# Patient Record
Sex: MALE | Race: WHITE | NOT HISPANIC OR LATINO | URBAN - METROPOLITAN AREA
[De-identification: names, ages, dates, MRNs, and addresses within clinical notes are randomized per-mention and may not be internally consistent; named-entity substitution may affect disease eponyms.]

---

## 2017-01-04 PROBLEM — Z00.00 ENCOUNTER FOR PREVENTIVE HEALTH EXAMINATION: Status: ACTIVE | Noted: 2017-01-04

## 2018-10-10 ENCOUNTER — OUTPATIENT (OUTPATIENT)
Dept: OUTPATIENT SERVICES | Facility: HOSPITAL | Age: 78
LOS: 1 days | End: 2018-10-10
Payer: MEDICARE

## 2018-10-10 ENCOUNTER — APPOINTMENT (OUTPATIENT)
Dept: CT IMAGING | Facility: CLINIC | Age: 78
End: 2018-10-10
Payer: MEDICARE

## 2018-10-10 DIAGNOSIS — Z00.8 ENCOUNTER FOR OTHER GENERAL EXAMINATION: ICD-10-CM

## 2018-10-10 PROCEDURE — 74177 CT ABD & PELVIS W/CONTRAST: CPT

## 2018-10-10 PROCEDURE — 74177 CT ABD & PELVIS W/CONTRAST: CPT | Mod: 26

## 2018-10-30 ENCOUNTER — APPOINTMENT (OUTPATIENT)
Dept: RADIOLOGY | Facility: CLINIC | Age: 78
End: 2018-10-30
Payer: MEDICARE

## 2018-10-30 ENCOUNTER — OUTPATIENT (OUTPATIENT)
Dept: OUTPATIENT SERVICES | Facility: HOSPITAL | Age: 78
LOS: 1 days | End: 2018-10-30
Payer: MEDICARE

## 2018-10-30 DIAGNOSIS — Z00.8 ENCOUNTER FOR OTHER GENERAL EXAMINATION: ICD-10-CM

## 2018-10-30 PROCEDURE — 71046 X-RAY EXAM CHEST 2 VIEWS: CPT

## 2018-10-30 PROCEDURE — 71046 X-RAY EXAM CHEST 2 VIEWS: CPT | Mod: 26

## 2019-05-20 ENCOUNTER — APPOINTMENT (OUTPATIENT)
Dept: RADIOLOGY | Facility: CLINIC | Age: 79
End: 2019-05-20
Payer: MEDICARE

## 2019-05-20 ENCOUNTER — OUTPATIENT (OUTPATIENT)
Dept: OUTPATIENT SERVICES | Facility: HOSPITAL | Age: 79
LOS: 1 days | End: 2019-05-20
Payer: MEDICARE

## 2019-05-20 DIAGNOSIS — Z00.8 ENCOUNTER FOR OTHER GENERAL EXAMINATION: ICD-10-CM

## 2019-05-20 PROCEDURE — 71046 X-RAY EXAM CHEST 2 VIEWS: CPT | Mod: 26

## 2019-05-20 PROCEDURE — 71046 X-RAY EXAM CHEST 2 VIEWS: CPT

## 2020-02-04 ENCOUNTER — OUTPATIENT (OUTPATIENT)
Dept: OUTPATIENT SERVICES | Facility: HOSPITAL | Age: 80
LOS: 1 days | End: 2020-02-04
Payer: MEDICARE

## 2020-02-04 ENCOUNTER — APPOINTMENT (OUTPATIENT)
Dept: RADIOLOGY | Facility: CLINIC | Age: 80
End: 2020-02-04
Payer: MEDICARE

## 2020-02-04 DIAGNOSIS — Z00.8 ENCOUNTER FOR OTHER GENERAL EXAMINATION: ICD-10-CM

## 2020-02-04 PROCEDURE — 71046 X-RAY EXAM CHEST 2 VIEWS: CPT | Mod: 26

## 2020-02-04 PROCEDURE — 71046 X-RAY EXAM CHEST 2 VIEWS: CPT

## 2020-10-20 ENCOUNTER — APPOINTMENT (OUTPATIENT)
Dept: RADIOLOGY | Facility: CLINIC | Age: 80
End: 2020-10-20
Payer: MEDICARE

## 2020-10-20 ENCOUNTER — OUTPATIENT (OUTPATIENT)
Dept: OUTPATIENT SERVICES | Facility: HOSPITAL | Age: 80
LOS: 1 days | End: 2020-10-20
Payer: MEDICARE

## 2020-10-20 DIAGNOSIS — Z00.8 ENCOUNTER FOR OTHER GENERAL EXAMINATION: ICD-10-CM

## 2020-10-20 PROCEDURE — 71046 X-RAY EXAM CHEST 2 VIEWS: CPT | Mod: 26

## 2020-10-20 PROCEDURE — 71046 X-RAY EXAM CHEST 2 VIEWS: CPT

## 2021-03-26 ENCOUNTER — RESULT REVIEW (OUTPATIENT)
Age: 81
End: 2021-03-26

## 2021-11-27 ENCOUNTER — EMERGENCY (EMERGENCY)
Facility: HOSPITAL | Age: 81
LOS: 1 days | Discharge: HOME | End: 2021-11-27
Attending: EMERGENCY MEDICINE | Admitting: EMERGENCY MEDICINE
Payer: MEDICARE

## 2021-11-27 VITALS
DIASTOLIC BLOOD PRESSURE: 78 MMHG | SYSTOLIC BLOOD PRESSURE: 141 MMHG | TEMPERATURE: 97 F | OXYGEN SATURATION: 99 % | HEART RATE: 90 BPM | HEIGHT: 65 IN | RESPIRATION RATE: 18 BRPM | WEIGHT: 164.91 LBS

## 2021-11-27 VITALS
OXYGEN SATURATION: 99 % | RESPIRATION RATE: 16 BRPM | TEMPERATURE: 98 F | DIASTOLIC BLOOD PRESSURE: 93 MMHG | SYSTOLIC BLOOD PRESSURE: 164 MMHG | HEART RATE: 85 BPM

## 2021-11-27 PROCEDURE — 99283 EMERGENCY DEPT VISIT LOW MDM: CPT

## 2021-11-27 RX ORDER — INDOMETHACIN 50 MG
1 CAPSULE ORAL
Qty: 15 | Refills: 0
Start: 2021-11-27 | End: 2021-12-01

## 2021-11-27 RX ORDER — INDOMETHACIN 50 MG
25 CAPSULE ORAL ONCE
Refills: 0 | Status: COMPLETED | OUTPATIENT
Start: 2021-11-27 | End: 2021-11-27

## 2021-11-27 RX ADMIN — Medication 40 MILLIGRAM(S): at 11:37

## 2021-11-27 RX ADMIN — Medication 25 MILLIGRAM(S): at 11:37

## 2021-11-27 NOTE — ED ADULT NURSE NOTE - NS ED NOTE  TALK SOMEONE YN
----- Message from Justyna Gonzalez sent at 10/19/2017  3:34 PM CDT -----  Contact: Patient  Patient called and stated she was in the office today and she was given a sheet that she brought home with her. She isn't sure if she was supposed to hand it back in to registration. Please call her at [phone].    Thanks,  Justyna   No

## 2021-11-27 NOTE — ED PROVIDER NOTE - PATIENT PORTAL LINK FT
You can access the FollowMyHealth Patient Portal offered by [facility] by registering at the following website: [email or web address]. By joining [employer]’s FollowMyHealth portal, you will also be able to view your health information using other applications (apps) compatible with our system.

## 2021-11-27 NOTE — ED PROVIDER NOTE - MUSCULOSKELETAL, MLM
Marked tenderness to palpation even with light touch to right medial foot with slight erythema and warmth

## 2021-11-27 NOTE — ED ADULT NURSE NOTE - OBJECTIVE STATEMENT
Right foot redness & swelling, positive pedal pulses, skin intact, mild swelling noted at the malleolus, afebrile, pain when ambulating 5/10.

## 2021-11-27 NOTE — ED PROVIDER NOTE - NSICDXPASTSURGICALHX_GEN_ALL_CORE_FT
PAST SURGICAL HISTORY:  Neck mass lesion back of neck excised    Throat mass pre-cancerous tumor removed 1990

## 2021-11-27 NOTE — ED PROVIDER NOTE - OBJECTIVE STATEMENT
82 yo white male with atraumatic pain to right medial foot x few days. Also note that area was slight red and warm. States that even having light sheet touching area created pain. Exquisitely sensitive to light touch. No fever or chills. No nausea or vomiting.

## 2021-11-27 NOTE — ED PROVIDER NOTE - CPE EDP NEURO NORM
OT ACUTE Treatment                                                                                                                                                BASELINE STATUS COMPARED WITH CURRENT STATUS: Presents with ADL/IADL status below baseline which was modified independent-min assist . Pt from mobile home with son.    ASSESSMENT:  Treatment today focused on out of bed activity in prep for ADL completion at sink. Pt requires min assist/light steadying assist x1 for all transfers/mobility in room with 2ww. Pt requires increased time to complete all activity due to fatigue/deconditioning. Pt requires complete assist to complete all low body cares which is significantly below baseline. Anticipate pt would benfit from less intensive rehab stay to progress strength/endurance and independence during ADLs/transfers.    RECOMMENDATIONS FOR DISCHARGE:  Recommendations for Discharge: OT: Less intensive rehab (08/07/17 1532)  OT Identified Barriers to Discharge: Medical status, weakness.   Equipment:  PT/OT Mobility Equipment for Discharge: no needs anticipated (07/28/17 0940)  PT/OT ADL Equipment for Discharge: Continue to assess. (08/02/17 2962)    PLAN: Continue skilled OT  Treatment Plan for Next Session: commode transfer vs ADL out of bed        Frequency Comments: M-F, (SA), LH, Alexandra, AM    AM-PAC Outcomes -Daily Activity Domain  How much help from another person does the patient currently need?*  Task Score  Norm   1. Putting on and taking off regular lower body clothing? 1 - Total   2. Bathing (including washing, rinsing, drying)?   2 - A Lot   3. Toileting, which includes using toilet, bedpan, or urinal? 2 - A Lot   4. Putting on and taking off regular upper body clothing? 3 - A Little   5. Taking care of personal grooming such as brushing teeth? 3 - A Little   6. Eating meals?  3 - A Little   Raw Score: 14/24   Converted Score:  33.39 (Raw score = 14)   G code conversion CK: 40- 59% impairment (Raw score:  13-18)   Converted score >39.4 predictive of discharge to home  *Score based on clinical judgment/expected performance, may not have been performed during this session  Scoring Guidelines  1) Patient may use assistive devices unless otherwise indicated in question  2) Do not consider help for management of medical devices only (IV poles, catheters, NG, etc) as part of assist level  3) If patient requires device that substitutes for toileting or eating function (catheter, NG tube, PEG) they do not engage in these activities and are scored as Total  4) If activity was not observed and patient unable to do the activity, select \"Total\" .  If the patient can do the activity but was not directly observed, use profession judgment to determine how much assistance needed.    Diagnosis:  1. Anemia, unspecified type    2. Iron deficiency    3. Hypoxia        Co-morbidities:   Patient Active Problem List   Diagnosis   • CAD (coronary artery disease)   • Hyperlipidemia   • Gastroparesis   • Obesity   • HTN (hypertension)   • Diabetes mellitus (CMS/HCC)   • Congestive heart failure (CMS/HCC)   • Ischemic cardiomyopathy   • End stage renal disease (CMS/HCC)   • Dermatophytosis of nail   • Pain in limb   • Other hammer toe (acquired)   • Edema   • S/P TKR (total knee replacement)   • History of total hip arthroplasty   • Knee joint replacement status   • Osteoarthrosis, unspecified whether generalized or localized, pelvic region and thigh   • Ovarian tumor of borderline malignancy, right   • PMB (postmenopausal bleeding)   • Brachial vein thrombosis, left (CMS/HCC)   • Colon cancer (CMS/HCC)   • Lymphedema of both lower extremities   • Panniculitis, unspecified       Precautions:  Precautions  Other Precautions: Bariatric needs, transferred to CCU 8/1/17 due to hypoxia/AMS. (08/02/17 1602)  Precautions Comments: Hx CHF, COPD, HTN (08/02/17 1602)    Prior Living Situation:  Type of Home: Mobile home (07/27/17 1400)  Lives With:  Spouse (07/27/17 1400)    EDUCATION:   On this date, the patient was educated on plan of care.    The response to education was: Verbalizes understanding                                                    SUBJECTIVE: Patient's Personal Goal: get stonger, more independent (08/07/17 1532)   Subjective: pt agreeable to out of bed activity (08/07/17 1532)  Subjective/Objective Comments: pt seated in w/c at end of session with call light  (08/07/17 1532)    OBJECTIVE:Basic Lines: Capped IV;Street catheter;Telemetry (08/07/17 1532)    RN reported Stoddard Fall Scale Score: 35       Last 24 hours of Functional Data     ADLs  Self Cares/ADL's  Bathing Assistance: Total Assist - Non-dependent (08/07/17 1532)  Bathing Deficit: Left lower leg including foot;Right lower leg including foot (and to lotion following cleaning) (08/07/17 1532)  Footwear Assistance: Total Assist - Non-dependent (08/07/17 1532)  Lower Body Dressing Deficit: Don/doff R sock;Don/doff L sock;Don/doff R shoe;Don/doff L shoe (08/07/17 1532)  Self Cares/ADL's Comments #1: pt limited with low body cares due to weakness/fatigue (08/07/17 1532)    Household mobility  Household Mobility  Supine to Sit: Minimal Assist (Min) (08/07/17 1532)  Sit to Stand: Touching/Steadying Assistance (08/07/17 1532)  Stand to Sit: Touching/Steadying Assistance (08/07/17 1532)  Transfer Equipment: 2ww, gait belt (08/07/17 1532)  Sitting - Static: Modified Independent (08/07/17 1532)  Sitting - Dynamic: Supervision (08/07/17 1532)  Standing - Static: Supervision (08/07/17 1532)  Standing - Dynamic: Touching/Steadying Assistance (08/07/17 1532)  Household Mobility Comments #1: overall light assist required to complete all transfers/mobility however pt with low activity tolerance requiring increased time (08/07/17 1532)    Home Management       Tolerance  OT Activity Tolerance  Activity Tolerance: 1:1 Activity to rest (08/07/17 1532)  Activity Tolerance Comments: fair (08/07/17  1532)    Vitals       Cognition            Patient's Personal Goal: get stonger, more independent (08/07/17 1532)    Therapy Goals  Goals  Short Term Goals to Be Reviewed On: 08/09/17 (08/02/17 1602)  Short Term Goals Are The Same as Discharge Goals: Yes (08/02/17 1602)  Goal Agreement: Patient agrees with goals and treatment plan (08/02/17 1602)  Grooming Discharge Goal 1: mod I standing at sink (08/02/17 1602)  Bathing Discharge Goal 1: mod I spongebathing (08/02/17 1602)  Dressing Discharge Goal 1: mod I (AE prn) (08/02/17 1602)  Toileting Discharge Goal 1: mod I (08/02/17 1602)    Interventions and Treatment Time  Treatment Interventions: ADL retraining;Functional transfer training;UE strengthening/ROM;Endurance training;Patient/Family training;Equipment eval/education;Compensatory technique education (08/02/17 1602)  OT Time Spent: 38 minutes (08/07/17 1532)      See OT flowsheet for full details regarding the OT therapy provided.      normal...

## 2021-11-27 NOTE — ED PROVIDER NOTE - CARE PROVIDER_API CALL
Hitesh Sanchez (MD)  Internal Medicine; Rheumatology  [address] [place] [address]  Phone: [phone]  Fax: [phone]  Follow Up Time:

## 2021-11-27 NOTE — ED ADULT NURSE NOTE - CHIEF COMPLAINT QUOTE
Patient is a 80yo male complaining of right foot pain redness starting thursday morning. Patient went to [facility] and was sent to [facility] for cellulitis

## 2021-11-27 NOTE — ED ADULT TRIAGE NOTE - CHIEF COMPLAINT QUOTE
Patient is a 82yo male complaining of right foot pain redness starting thursday morning. Patient went to [facility] and was sent to [facility] for cellulitis

## 2021-11-27 NOTE — ED PROVIDER NOTE - NSFOLLOWUPINSTRUCTIONS_ED_ALL_ED_FT
Rest  Take INDOCIN 25 mg every 8-hours for the next 5-days  Take PREDNISONE 20 mg, 2-tablet every day for the next 5-days  Follow-up with your doctor this week  Return here if needed          [employer]® CareNotes®     :  [facility]  	                       GOUT - AfterCare(R) Instructions(ER/ED)           Gout    WHAT YOU NEED TO KNOW:    Gout is a form of arthritis that causes severe joint pain, redness, swelling, and stiffness. Acute gout pain starts suddenly, gets worse quickly, and stops on its own. Acute gout can become chronic and cause permanent damage to the joints.    DISCHARGE INSTRUCTIONS:    Return to the emergency department if:   •You have severe pain in one or more of your joints that you cannot tolerate.      •You have a fever or redness that spreads beyond the joint area.      Call your doctor if:   •You have new symptoms, such as a rash, after you start gout treatment.      •Your joint pain and swelling do not go away, even after treatment.      •You are not urinating as much or as often as you usually do.      •You have trouble taking your gout medicines.      •You have questions or concerns about your condition or care.      Medicines: You may need any of the following:   •Prescription pain medicine may be given. Ask your healthcare provider how to take this medicine safely. Some prescription pain medicines contain acetaminophen. Do not take other medicines that contain acetaminophen without talking to your healthcare provider. Too much acetaminophen may cause liver damage. Prescription pain medicine may cause constipation. Ask your healthcare provider how to prevent or treat constipation.       •NSAIDs, such as ibuprofen, help decrease swelling, pain, and fever. This medicine is available with or without a doctor's order. NSAIDs can cause stomach bleeding or kidney problems in certain people. If you take blood thinner medicine, always ask your healthcare provider if NSAIDs are safe for you. Always read the medicine label and follow directions.      •Gout medicine decreases joint pain and swelling. It may also be given to prevent new gout attacks.      •Steroids reduce inflammation and can help your joint stiffness and pain during gout attacks.      •Uric acid medicine may be given to reduce the amount of uric acid your body makes. Some medicines may help you pass more uric acid when you urinate.      •Take your medicine as directed. Contact your healthcare provider if you think your medicine is not helping or if you have side effects. Tell him or her if you are allergic to any medicine. Keep a list of the medicines, vitamins, and herbs you take. Include the amounts, and when and why you take them. Bring the list or the pill bottles to follow-up visits. Carry your medicine list with you in case of an emergency.      Manage your symptoms:     R.I.C.E.     •Rest your painful joint so it can heal. Your healthcare provider may recommend crutches or a walker if the affected joint is in a leg.      •Apply ice to your joint. Ice decreases pain and swelling. Use an ice pack, or put crushed ice in a plastic bag. Cover the ice pack or bag with a towel before you apply it to your painful joint. Apply ice for 15 to 20 minutes every hour, or as directed.      •Elevate your joint. Elevation helps reduce swelling and pain. Raise your joint above the level of your heart as often as you can. Prop your painful joint on pillows to keep it above your heart comfortably.      •Go to physical therapy if directed. A physical therapist can teach you exercises to improve flexibility and range of motion.      Help prevent gout attacks:   •Do not eat high-purine foods. These foods include meats, seafood, asparagus, spinach, cauliflower, and some types of beans. Healthcare providers may tell you to eat more low-fat milk products, such as yogurt. Milk products may decrease your risk for gout attacks. Vitamin C and coffee may also help. Your healthcare provider or dietitian can help you create a meal plan.      •Drink liquids as directed. Liquids such as water help remove uric acid from your body. Ask how much liquid to drink each day and which liquids are best for you.      •Maintain a healthy weight. Weight loss may decrease the amount of uric acid in your body. Ask your healthcare provider what a healthy weight is for you. Ask him or her to help you create a weight loss plan if you are overweight.      •Control your blood sugar level if you have diabetes. Keep your blood sugar level in a normal range. This can help prevent gout attacks.      •Limit or do not drink alcohol as directed. Alcohol can trigger a gout attack. Alcohol also increases your risk for dehydration. Ask your healthcare provider if alcohol is safe for you.      Follow up with your doctor as directed: You may be referred to a rheumatologist or podiatrist. Write down your questions so you remember to ask them during your visits.       © Copyright [employer] 2021

## 2021-11-27 NOTE — ED PROVIDER NOTE - BIRTH SEX
Male
Exclude Pending Lab and Radiology orders from printing on the Patient's Discharge Instructions, due to Privacy Concerns.

## 2021-11-27 NOTE — ED PROVIDER NOTE - NSICDXPASTMEDICALHX_GEN_ALL_CORE_FT
PAST MEDICAL HISTORY:  COPD (chronic obstructive pulmonary disease)     Hyperlipemia     Hypertension     Prostate enlargement

## 2024-05-03 ENCOUNTER — OUTPATIENT (OUTPATIENT)
Dept: OUTPATIENT SERVICES | Facility: HOSPITAL | Age: 84
LOS: 1 days | End: 2024-05-03
Payer: MEDICARE

## 2024-05-03 ENCOUNTER — APPOINTMENT (OUTPATIENT)
Dept: RADIOLOGY | Facility: CLINIC | Age: 84
End: 2024-05-03
Payer: MEDICARE

## 2024-05-03 DIAGNOSIS — R05.9 COUGH, UNSPECIFIED: ICD-10-CM

## 2024-05-03 PROCEDURE — 71046 X-RAY EXAM CHEST 2 VIEWS: CPT

## 2024-05-03 PROCEDURE — 71046 X-RAY EXAM CHEST 2 VIEWS: CPT | Mod: 26

## 2024-09-27 ENCOUNTER — EMERGENCY (EMERGENCY)
Facility: HOSPITAL | Age: 84
LOS: 1 days | Discharge: HOME | End: 2024-09-27
Attending: STUDENT IN AN ORGANIZED HEALTH CARE EDUCATION/TRAINING PROGRAM | Admitting: STUDENT IN AN ORGANIZED HEALTH CARE EDUCATION/TRAINING PROGRAM
Payer: MEDICARE

## 2024-09-27 VITALS
WEIGHT: 160.06 LBS | DIASTOLIC BLOOD PRESSURE: 88 MMHG | RESPIRATION RATE: 18 BRPM | HEART RATE: 127 BPM | TEMPERATURE: 98 F | SYSTOLIC BLOOD PRESSURE: 152 MMHG | OXYGEN SATURATION: 97 % | HEIGHT: 66 IN

## 2024-09-27 VITALS
TEMPERATURE: 98 F | RESPIRATION RATE: 18 BRPM | OXYGEN SATURATION: 98 % | HEART RATE: 87 BPM | SYSTOLIC BLOOD PRESSURE: 140 MMHG | DIASTOLIC BLOOD PRESSURE: 78 MMHG

## 2024-09-27 LAB
ALBUMIN SERPL ELPH-MCNC: 3.3 G/DL — SIGNIFICANT CHANGE UP (ref 3.3–5)
ALP SERPL-CCNC: 78 U/L — SIGNIFICANT CHANGE UP (ref 40–120)
ALT FLD-CCNC: 13 U/L — SIGNIFICANT CHANGE UP (ref 12–78)
ANION GAP SERPL CALC-SCNC: 10 MMOL/L — SIGNIFICANT CHANGE UP (ref 5–17)
APTT BLD: 30 SEC — SIGNIFICANT CHANGE UP (ref 24.5–35.6)
AST SERPL-CCNC: 14 U/L — LOW (ref 15–37)
BASOPHILS # BLD AUTO: 0.04 K/UL — SIGNIFICANT CHANGE UP (ref 0–0.2)
BASOPHILS NFR BLD AUTO: 0.7 % — SIGNIFICANT CHANGE UP (ref 0–2)
BILIRUB SERPL-MCNC: 0.7 MG/DL — SIGNIFICANT CHANGE UP (ref 0.2–1.2)
BUN SERPL-MCNC: 29 MG/DL — HIGH (ref 7–23)
CALCIUM SERPL-MCNC: 9.3 MG/DL — SIGNIFICANT CHANGE UP (ref 8.5–10.1)
CHLORIDE SERPL-SCNC: 107 MMOL/L — SIGNIFICANT CHANGE UP (ref 96–108)
CO2 SERPL-SCNC: 25 MMOL/L — SIGNIFICANT CHANGE UP (ref 22–31)
CREAT SERPL-MCNC: 2.1 MG/DL — HIGH (ref 0.5–1.3)
EGFR: 30 ML/MIN/1.73M2 — LOW
EOSINOPHIL # BLD AUTO: 0.09 K/UL — SIGNIFICANT CHANGE UP (ref 0–0.5)
EOSINOPHIL NFR BLD AUTO: 1.6 % — SIGNIFICANT CHANGE UP (ref 0–6)
GLUCOSE SERPL-MCNC: 162 MG/DL — HIGH (ref 70–99)
HCT VFR BLD CALC: 35.5 % — LOW (ref 39–50)
HGB BLD-MCNC: 11.7 G/DL — LOW (ref 13–17)
IMM GRANULOCYTES NFR BLD AUTO: 0.5 % — SIGNIFICANT CHANGE UP (ref 0–0.9)
INR BLD: 1.07 RATIO — SIGNIFICANT CHANGE UP (ref 0.85–1.16)
LYMPHOCYTES # BLD AUTO: 1.22 K/UL — SIGNIFICANT CHANGE UP (ref 1–3.3)
LYMPHOCYTES # BLD AUTO: 21 % — SIGNIFICANT CHANGE UP (ref 13–44)
MCHC RBC-ENTMCNC: 29.9 PG — SIGNIFICANT CHANGE UP (ref 27–34)
MCHC RBC-ENTMCNC: 33 GM/DL — SIGNIFICANT CHANGE UP (ref 32–36)
MCV RBC AUTO: 90.8 FL — SIGNIFICANT CHANGE UP (ref 80–100)
MONOCYTES # BLD AUTO: 0.55 K/UL — SIGNIFICANT CHANGE UP (ref 0–0.9)
MONOCYTES NFR BLD AUTO: 9.5 % — SIGNIFICANT CHANGE UP (ref 2–14)
NEUTROPHILS # BLD AUTO: 3.87 K/UL — SIGNIFICANT CHANGE UP (ref 1.8–7.4)
NEUTROPHILS NFR BLD AUTO: 66.7 % — SIGNIFICANT CHANGE UP (ref 43–77)
NRBC # BLD: 0 /100 WBCS — SIGNIFICANT CHANGE UP (ref 0–0)
PLATELET # BLD AUTO: 186 K/UL — SIGNIFICANT CHANGE UP (ref 150–400)
POTASSIUM SERPL-MCNC: 4.4 MMOL/L — SIGNIFICANT CHANGE UP (ref 3.5–5.3)
POTASSIUM SERPL-SCNC: 4.4 MMOL/L — SIGNIFICANT CHANGE UP (ref 3.5–5.3)
PROT SERPL-MCNC: 6.6 G/DL — SIGNIFICANT CHANGE UP (ref 6–8.3)
PROTHROM AB SERPL-ACNC: 12.6 SEC — SIGNIFICANT CHANGE UP (ref 9.9–13.4)
RBC # BLD: 3.91 M/UL — LOW (ref 4.2–5.8)
RBC # FLD: 13.1 % — SIGNIFICANT CHANGE UP (ref 10.3–14.5)
SODIUM SERPL-SCNC: 142 MMOL/L — SIGNIFICANT CHANGE UP (ref 135–145)
WBC # BLD: 5.8 K/UL — SIGNIFICANT CHANGE UP (ref 3.8–10.5)
WBC # FLD AUTO: 5.8 K/UL — SIGNIFICANT CHANGE UP (ref 3.8–10.5)

## 2024-09-27 PROCEDURE — 86077 PHYS BLOOD BANK SERV XMATCH: CPT

## 2024-09-27 PROCEDURE — 71045 X-RAY EXAM CHEST 1 VIEW: CPT | Mod: 26

## 2024-09-27 PROCEDURE — 99285 EMERGENCY DEPT VISIT HI MDM: CPT

## 2024-09-27 PROCEDURE — 93010 ELECTROCARDIOGRAM REPORT: CPT

## 2024-09-27 PROCEDURE — 73090 X-RAY EXAM OF FOREARM: CPT | Mod: 26,LT

## 2024-09-27 PROCEDURE — 73120 X-RAY EXAM OF HAND: CPT | Mod: 26,RT

## 2024-09-27 RX ORDER — ACETAMINOPHEN 325 MG/1
2 TABLET ORAL
Qty: 30 | Refills: 0
Start: 2024-09-27

## 2024-09-27 RX ORDER — OXYCODONE AND ACETAMINOPHEN 7.5; 325 MG/1; MG/1
1 TABLET ORAL
Qty: 5 | Refills: 0
Start: 2024-09-27

## 2024-09-27 RX ORDER — SODIUM CHLORIDE 9 MG/ML
1000 INJECTION INTRAMUSCULAR; INTRAVENOUS; SUBCUTANEOUS ONCE
Refills: 0 | Status: COMPLETED | OUTPATIENT
Start: 2024-09-27 | End: 2024-09-27

## 2024-09-27 RX ORDER — ACETAMINOPHEN 325 MG/1
1000 TABLET ORAL ONCE
Refills: 0 | Status: COMPLETED | OUTPATIENT
Start: 2024-09-27 | End: 2024-09-27

## 2024-09-27 RX ORDER — DOXYCYCLINE MONOHYDRATE 100 MG
1 TABLET ORAL
Qty: 20 | Refills: 0
Start: 2024-09-27 | End: 2024-10-06

## 2024-09-27 RX ORDER — POVIDONE-IODINE 10 %
1 SOLUTION, NON-ORAL TOPICAL ONCE
Refills: 0 | Status: COMPLETED | OUTPATIENT
Start: 2024-09-27 | End: 2024-09-27

## 2024-09-27 RX ORDER — TETANUS TOXOID, REDUCED DIPHTHERIA TOXOID AND ACELLULAR PERTUSSIS VACCINE, ADSORBED 5; 2.5; 8; 8; 2.5 [IU]/.5ML; [IU]/.5ML; UG/.5ML; UG/.5ML; UG/.5ML
0.5 SUSPENSION INTRAMUSCULAR ONCE
Refills: 0 | Status: COMPLETED | OUTPATIENT
Start: 2024-09-27 | End: 2024-09-27

## 2024-09-27 RX ORDER — DOXYCYCLINE MONOHYDRATE 100 MG
100 TABLET ORAL ONCE
Refills: 0 | Status: COMPLETED | OUTPATIENT
Start: 2024-09-27 | End: 2024-09-27

## 2024-09-27 RX ORDER — LIDOCAINE HCL/EPINEPHRINE 2%-1:50000
5 SYRINGE (ML) INJECTION ONCE
Refills: 0 | Status: COMPLETED | OUTPATIENT
Start: 2024-09-27 | End: 2024-09-27

## 2024-09-27 RX ORDER — BACITRACIN 500 UNIT/G
1 OINTMENT (GRAM) TOPICAL ONCE
Refills: 0 | Status: COMPLETED | OUTPATIENT
Start: 2024-09-27 | End: 2024-09-27

## 2024-09-27 RX ORDER — IBUPROFEN 600 MG
1 TABLET ORAL
Qty: 20 | Refills: 0
Start: 2024-09-27

## 2024-09-27 RX ADMIN — Medication 1 APPLICATION(S): at 18:44

## 2024-09-27 RX ADMIN — TETANUS TOXOID, REDUCED DIPHTHERIA TOXOID AND ACELLULAR PERTUSSIS VACCINE, ADSORBED 0.5 MILLILITER(S): 5; 2.5; 8; 8; 2.5 SUSPENSION INTRAMUSCULAR at 14:01

## 2024-09-27 RX ADMIN — SODIUM CHLORIDE 1000 MILLILITER(S): 9 INJECTION INTRAMUSCULAR; INTRAVENOUS; SUBCUTANEOUS at 14:00

## 2024-09-27 RX ADMIN — ACETAMINOPHEN 400 MILLIGRAM(S): 325 TABLET ORAL at 14:00

## 2024-09-27 RX ADMIN — ACETAMINOPHEN 1000 MILLIGRAM(S): 325 TABLET ORAL at 14:40

## 2024-09-27 RX ADMIN — Medication 100 MILLIGRAM(S): at 14:31

## 2024-09-27 RX ADMIN — Medication 5 MILLILITER(S): at 18:44

## 2024-09-27 NOTE — ED PROVIDER NOTE - PHYSICAL EXAMINATION
Gen: Awake, Alert, NAD, elderly  Head:  NC/AT  Eyes:  PERRL, EOMI, Conjunctiva pink, no scleral icterus  ENT:  no exudates, no erythema, uvula midline, TMs clear bilaterally, moist mucus membranes  Neck: supple, nontender, no meningismus, no JVD, trachea midline  Cardiac/CV:  S1 S2, RRR, no murmurs  Respiratory/Pulm:  CTAB, good air movement, normal resp effort, no wheezes/stridor/retractions/rales/rhonchi  Gastrointestinal/Abdomen:  Soft, nontender, nondistended, no rebound/guarding  Pelvis: stable, nontender, Hips: FROM, nontender  Back:  no CVAT, no MLT  Ext:  warm, well perfused, moving all extremities spontaneously, no cyanosis, no erythema, no edema, distal pulses intact  Skin: about 4cm circular area of skin avulsion down to fascia and muscle is visualized, unclear if there is deeper tissue damage.   Neuro:  AAOx3, sensation intact, motor 5/5 x 4 extremities, clear speech, normal gait

## 2024-09-27 NOTE — ED PROVIDER NOTE - PATIENT PORTAL LINK FT
You can access the FollowMyHealth Patient Portal offered by [facility] by registering at the following website: [email or web address]. By joining [employer]’s FollowMyHealth portal, you will also be able to view your health information using other applications (apps) compatible with our system.

## 2024-09-27 NOTE — ED PROCEDURE NOTE - CPROC ED LACER REPAIR DETAIL1
The wound was explored to base in bloodless field./All foreign material was removed./Multiple flaps were aligned./Undermining was performed./Extensive debridement was performed.

## 2024-09-27 NOTE — ED PROCEDURE NOTE - PROCEDURE ADDITIONAL DETAILS
-LARGE WOUND 16 X 8 CM TISSUE LOSS  -ELECTROCAUTERY USED TO ELEVATE ADIPOSE CUTANEOUS FLAPS WITH BURROWS TRIANGLE EXCISED ALLOWING FOR THE ADVANCEMENT FLAPS TO BE TRANSFERRED TO A CENTRAL POINT AND THEN CLOSED IN MULTIPLE LAYERS AS DESCRIBED  -THE TISSUE WAS JUDICIOUSLY DEBRIDED   -GIVEN DOSE OF CLINDAMYCIN DUE TO PCN ALLERGY AND SENT WITH PRESCRIPTION  -ARM IMMOBILIZER PLACED  -DISCUSSED WITH PATIENT AND DAUGHTER ABOUT THE COMPLICATIONS OF CLOSING DOGBITES AND I WILL CHECK WOUND ON SUNDAY  -NO SHOWERING  -DISCUSSED PERFUSION AND EDEMA OF HAND WITH DAUGHTER AND WILL CONTACT ME IF THERE ARE ANY CHANGES

## 2024-09-27 NOTE — ED PROVIDER NOTE - PROVIDER TOKENS
PROVIDER:[TOKEN:[[id number]],FOLLOWUP:[1-3 Days]],PROVIDER:[TOKEN:[[id number]],FOLLOWUP:[1-3 Days]]

## 2024-09-27 NOTE — ED ADULT TRIAGE NOTE - SOURCE OF INFORMATION
Bleeding that does not stop/Swelling that gets worse/Fever greater than (need to indicate Fahrenheit or Celsius)/Wound/Surgical Site with redness, or foul smelling discharge or pus/Nausea and vomiting that does not stop
Patient

## 2024-09-27 NOTE — ED PROVIDER NOTE - OBJECTIVE STATEMENT
84M who presents with dog bite. patient states earlier today he was out on his street going for a walk, one of his neighbors was audelia dog. patient was ?petting the dog when the dog bit him on left forearm with avulsion of skin and tissues with likely violation of fascia, small punctate wound nearby 84M who presents with dog bite. patient states earlier today he was out on his street going for a walk, one of his neighbors was audelia dog. patient was ?petting the dog when the dog bit him on left forearm with avulsion of skin and tissues with likely violation of fascia, small punctate wound nearby and on right thumb as well. unknown last tetanus

## 2024-09-27 NOTE — ED ADULT TRIAGE NOTE - CHIEF COMPLAINT QUOTE
pt attacked by dog sustained left forearm full tissue injury with puncture wound and puncture wound to right palm. wound not currently bleeding. unknown owner

## 2024-09-27 NOTE — ED PROVIDER NOTE - CARE PROVIDER_API CALL
Loi Durand  Internal Medicine  [address], [address]  [place] [address]  Phone: [phone]  Fax: [phone]  Follow Up Time: 1-3 Days    Barrington Alonso  Plastic Surgery  [address] [place] [address]  Phone: [phone]  Fax: [phone]  Follow Up Time: 1-3 Days

## 2024-09-27 NOTE — ED PROVIDER NOTE - CLINICAL SUMMARY MEDICAL DECISION MAKING FREE TEXT BOX
Patient is a 84y old  Male who presents with a chief complaint of dog bite. patient was bit by known dog. he has large area of skin and some underlying tissue avulsion on left forearm, small punctate owund nearby and another wound to right thumb base. unknown last tetanus    PE: Patient is well appearing, no acute distress, no signs of head trauma, lungs clear bilaterally, abdomen is soft and nontender to palpation without guarding or rebound, normal pulses in all extremities

## 2024-09-27 NOTE — ED ADULT NURSE NOTE - OBJECTIVE STATEMENT
Pt presents to the ED c/o L arm wound due to dog bite. Dog vaccine UTD. Muscle tissue and fat exposed, bleeding controlled at this time. IV established in left arm with a 20G, labs drawn and sent, call bell in reach, warm blanket provided, bed in lowest position, side rails up x2, MD evaluation in progress. Pt is well appearing, in no acute distress at this time. Pt denies weakness/fatigue at this time. Small laceration noted to R thumb as well.

## 2024-09-27 NOTE — ED PROVIDER NOTE - PROGRESS NOTE DETAILS
spoke with LUCIE about the dog bite, since patient does not have the information on the neighbor at this time, spoke with LUCIE about the dog bite, apparently the dog is actually the daughters and they are not concerned at all about the dog having rabies, they have had the dogs for a while. no indication for rabies at this time I, Dr. Tristan, received this patient in sign out at 1700 from Dr. Sauceda pending plastics recommendations Dr. Gustavo Tristan  wound closed by plastics. recommends dc with analgesics, antibiotics and follow up in office.

## 2024-09-30 PROCEDURE — 86870 RBC ANTIBODY IDENTIFICATION: CPT

## 2024-09-30 PROCEDURE — 96375 TX/PRO/DX INJ NEW DRUG ADDON: CPT | Mod: XU

## 2024-09-30 PROCEDURE — 90471 IMMUNIZATION ADMIN: CPT

## 2024-09-30 PROCEDURE — 85025 COMPLETE CBC W/AUTO DIFF WBC: CPT

## 2024-09-30 PROCEDURE — 86900 BLOOD TYPING SEROLOGIC ABO: CPT

## 2024-09-30 PROCEDURE — 86901 BLOOD TYPING SEROLOGIC RH(D): CPT

## 2024-09-30 PROCEDURE — 90715 TDAP VACCINE 7 YRS/> IM: CPT

## 2024-09-30 PROCEDURE — 36415 COLL VENOUS BLD VENIPUNCTURE: CPT

## 2024-09-30 PROCEDURE — 13121 CMPLX RPR S/A/L 2.6-7.5 CM: CPT

## 2024-09-30 PROCEDURE — 93005 ELECTROCARDIOGRAM TRACING: CPT

## 2024-09-30 PROCEDURE — 80053 COMPREHEN METABOLIC PANEL: CPT

## 2024-09-30 PROCEDURE — 85730 THROMBOPLASTIN TIME PARTIAL: CPT

## 2024-09-30 PROCEDURE — 73120 X-RAY EXAM OF HAND: CPT

## 2024-09-30 PROCEDURE — 13122 CMPLX RPR S/A/L ADDL 5 CM/>: CPT

## 2024-09-30 PROCEDURE — 85610 PROTHROMBIN TIME: CPT

## 2024-09-30 PROCEDURE — 99285 EMERGENCY DEPT VISIT HI MDM: CPT | Mod: 25

## 2024-09-30 PROCEDURE — 86850 RBC ANTIBODY SCREEN: CPT

## 2024-09-30 PROCEDURE — 71045 X-RAY EXAM CHEST 1 VIEW: CPT

## 2024-09-30 PROCEDURE — 86880 COOMBS TEST DIRECT: CPT

## 2024-09-30 PROCEDURE — 73090 X-RAY EXAM OF FOREARM: CPT

## 2024-09-30 PROCEDURE — 96374 THER/PROPH/DIAG INJ IV PUSH: CPT | Mod: XU
